# Patient Record
Sex: FEMALE | Race: WHITE | NOT HISPANIC OR LATINO | RURAL
[De-identification: names, ages, dates, MRNs, and addresses within clinical notes are randomized per-mention and may not be internally consistent; named-entity substitution may affect disease eponyms.]

---

## 2024-03-12 ENCOUNTER — LAB (OUTPATIENT)
Dept: RURAL CLINIC 2 | Facility: CLINIC | Age: 84
End: 2024-03-12

## 2024-03-12 ENCOUNTER — OV NP (OUTPATIENT)
Dept: RURAL CLINIC 2 | Facility: CLINIC | Age: 84
End: 2024-03-12
Payer: MEDICARE

## 2024-03-12 VITALS
DIASTOLIC BLOOD PRESSURE: 89 MMHG | TEMPERATURE: 97.1 F | BODY MASS INDEX: 24.84 KG/M2 | WEIGHT: 135 LBS | HEART RATE: 66 BPM | HEIGHT: 62 IN | SYSTOLIC BLOOD PRESSURE: 142 MMHG

## 2024-03-12 DIAGNOSIS — K52.89 (LYMPHOCYTIC) MICROSCOPIC COLITIS: ICD-10-CM

## 2024-03-12 DIAGNOSIS — R19.8 CHANGE IN BOWEL MOVEMENT: ICD-10-CM

## 2024-03-12 DIAGNOSIS — R10.84 ABDOMINAL CRAMPING, GENERALIZED: ICD-10-CM

## 2024-03-12 DIAGNOSIS — R93.3 ABNORMAL COMPUTED TOMOGRAPHY OF SIGMOID COLON: ICD-10-CM

## 2024-03-12 PROBLEM — 428283002: Status: ACTIVE | Noted: 2024-03-12

## 2024-03-12 PROBLEM — 247330004: Status: ACTIVE | Noted: 2024-03-12

## 2024-03-12 PROBLEM — 88111009: Status: ACTIVE | Noted: 2024-03-12

## 2024-03-12 PROBLEM — 129679001: Status: ACTIVE | Noted: 2024-03-12

## 2024-03-12 PROBLEM — 64226004: Status: ACTIVE | Noted: 2024-03-12

## 2024-03-12 PROCEDURE — 99204 OFFICE O/P NEW MOD 45 MIN: CPT | Performed by: NURSE PRACTITIONER

## 2024-03-12 RX ORDER — NAPROXEN 500 MG/1
TABLET ORAL
Qty: 0 | Refills: 0 | Status: DISCONTINUED | COMMUNITY
Start: 1900-01-01

## 2024-03-12 RX ORDER — TRIAMTERENE AND HYDROCHLOROTHIAZIDE 37.5; 25 MG/1; MG/1
CAPSULE ORAL
Qty: 0 | Refills: 0 | Status: ACTIVE | COMMUNITY
Start: 1900-01-01

## 2024-03-12 RX ORDER — ATENOLOL 50 MG/1
TABLET ORAL
Qty: 0 | Refills: 0 | Status: ACTIVE | COMMUNITY
Start: 1900-01-01

## 2024-03-12 RX ORDER — PRAVASTATIN SODIUM 40 MG/1
TABLET ORAL
Qty: 0 | Refills: 0 | Status: ACTIVE | COMMUNITY
Start: 1900-01-01

## 2024-03-12 RX ORDER — PREDNISONE 20 MG/1
1 TABLET TABLET ORAL ONCE A DAY
Status: ACTIVE | COMMUNITY

## 2024-03-12 RX ORDER — TEMAZEPAM 30 MG/1
CAPSULE ORAL
Qty: 0 | Refills: 0 | Status: ACTIVE | COMMUNITY
Start: 1900-01-01

## 2024-03-12 RX ORDER — SODIUM PICOSULFATE, MAGNESIUM OXIDE, AND ANHYDROUS CITRIC ACID 12; 3.5; 1 G/175ML; G/175ML; MG/175ML
175 ML THE FIRST DOSE THE EVENING BEFORE AND SECOND DOSE THE MORNING OF COLONOSCOPY LIQUID ORAL ONCE A DAY
Qty: 350 | Refills: 0 | OUTPATIENT
Start: 2024-03-12 | End: 2024-03-14

## 2024-03-12 RX ORDER — PANTOPRAZOLE SODIUM 40 MG/1
TAKE 1 TABLET (40 MG) BY ORAL ROUTE ONCE DAILY TABLET, DELAYED RELEASE ORAL 1
Qty: 90 | Refills: 3 | Status: DISCONTINUED | COMMUNITY
Start: 2019-05-23

## 2024-03-12 RX ORDER — ONDANSETRON 4 MG/1
ONE SL Q6 PRN NAUSEA TABLET, ORALLY DISINTEGRATING ORAL
Qty: 20 | Refills: 3 | Status: ACTIVE | COMMUNITY
Start: 2019-04-24

## 2024-03-12 NOTE — HPI-ZZZTODAY'S VISIT
The patient is an 83-year-old female who presents on referral from Loan Duffy NP for colitis.  A copy of this document to be sent to the referring provider.  The patient reports a change in bowels with intermittent loose stools and  left lower quadrant cramping  occurring intermittently for the past year.  In November she was seen at an urgent care facility in [place] for left lower quadrant pain and was diagnosed with diverticulitis confirmed by CT scan of the abdomen and pelvis.  She was treated with Augmentin and her symptoms resolved.  Patient was seen at an urgent care facility on February 27 for worsening abdominal pain and had a CT scan showing long segment wall thickening of the sigmoid colon without a discrete focal inflamed diverticulum suggestive of colitis. Stool testing was negative for C. difficile and was started on antibiotics as well as oral steroids.  She reports mild improvement in her symptoms and is currently having 6-8 semiloose bowel movements with left lower quadrant abdominal cramping.  She denies rectal bleeding.  Her last colonoscopy was completed over 10 years ago and she recalls findings of colon polyps.  Past medical history includes hypertension and hyperlipidemia.

## 2024-03-15 LAB
ADENOVIRUS F 40/41: NOT DETECTED
C. DIFFICILE TOXIN A/B, STOOL - QDX: NEGATIVE
CALPROTECTIN, STOOL - QDX: (no result)
CAMPYLOBACTER: NOT DETECTED
CLOSTRIDIUM DIFFICILE: DETECTED
ENTAMOEBA HISTOLYTICA: NOT DETECTED
ENTEROAGGREGATIVE E.COLI: NOT DETECTED
ENTEROTOXIGENIC E.COLI: NOT DETECTED
ESCHERICHIA COLI O157: NOT DETECTED
GIARDIA LAMBLIA: NOT DETECTED
NOROVIRUS GI/GII: NOT DETECTED
ROTAVIRUS A: NOT DETECTED
SALMONELLA SPP.: NOT DETECTED
SHIGA-LIKE TOXIN PRODUCING E.COLI: NOT DETECTED
SHIGELLA SPP. / ENTEROINVASIVE E.COLI: NOT DETECTED
VIBRIO PARAHAEMOLYTICUS: NOT DETECTED
VIBRIO SPP.: NOT DETECTED
YERSINIA ENTEROCOLITICA: NOT DETECTED

## 2024-03-27 ENCOUNTER — COLON (OUTPATIENT)
Dept: RURAL MEDICAL CENTER 4 | Facility: MEDICAL CENTER | Age: 84
End: 2024-03-27

## 2024-04-07 PROBLEM — 64226004: Status: ACTIVE | Noted: 2024-04-07

## 2024-04-09 ENCOUNTER — OV EP (OUTPATIENT)
Dept: RURAL CLINIC 2 | Facility: CLINIC | Age: 84
End: 2024-04-09

## 2024-04-10 ENCOUNTER — OV EP (OUTPATIENT)
Dept: RURAL CLINIC 8 | Facility: CLINIC | Age: 84
End: 2024-04-10
Payer: MEDICARE

## 2024-04-10 VITALS
HEART RATE: 58 BPM | HEIGHT: 62 IN | BODY MASS INDEX: 24.84 KG/M2 | TEMPERATURE: 96.6 F | WEIGHT: 135 LBS | SYSTOLIC BLOOD PRESSURE: 118 MMHG | DIASTOLIC BLOOD PRESSURE: 70 MMHG

## 2024-04-10 DIAGNOSIS — R10.84 GENERALIZED ABDOMINAL PAIN: ICD-10-CM

## 2024-04-10 DIAGNOSIS — R19.4 CHANGE IN BOWEL HABITS: ICD-10-CM

## 2024-04-10 DIAGNOSIS — K50.10 SEGMENTAL COLITIS WITHOUT COMPLICATION: ICD-10-CM

## 2024-04-10 DIAGNOSIS — R19.7 DIARRHEA, UNSPECIFIED TYPE: ICD-10-CM

## 2024-04-10 PROCEDURE — 99214 OFFICE O/P EST MOD 30 MIN: CPT | Performed by: INTERNAL MEDICINE

## 2024-04-10 RX ORDER — HYOSCYAMINE SULFATE 0.12 MG/1
1 TABLET UNDER THE TONGUE AND ALLOW TO DISSOLVE PRN TABLET SUBLINGUAL
Qty: 60 | Refills: 1 | Status: DISCONTINUED | COMMUNITY
Start: 2024-04-05 | End: 2024-06-04

## 2024-04-10 RX ORDER — TEMAZEPAM 30 MG/1
CAPSULE ORAL
Qty: 0 | Refills: 0 | Status: ACTIVE | COMMUNITY
Start: 1900-01-01

## 2024-04-10 RX ORDER — PREDNISONE 20 MG/1
1 TABLET TABLET ORAL ONCE A DAY
Status: DISCONTINUED | COMMUNITY

## 2024-04-10 RX ORDER — PRAVASTATIN SODIUM 40 MG/1
TABLET ORAL
Qty: 0 | Refills: 0 | Status: ACTIVE | COMMUNITY
Start: 1900-01-01

## 2024-04-10 RX ORDER — MESALAMINE 375 MG/1
4 CAPSULES IN THE MORNING CAPSULE, EXTENDED RELEASE ORAL ONCE A DAY
Qty: 120 | Refills: 5 | Status: DISCONTINUED | COMMUNITY
Start: 2024-03-30 | End: 2024-09-26

## 2024-04-10 RX ORDER — TRIAMTERENE AND HYDROCHLOROTHIAZIDE 37.5; 25 MG/1; MG/1
CAPSULE ORAL
Qty: 0 | Refills: 0 | Status: ACTIVE | COMMUNITY
Start: 1900-01-01

## 2024-04-10 RX ORDER — ONDANSETRON 4 MG/1
ONE SL Q6 PRN NAUSEA TABLET, ORALLY DISINTEGRATING ORAL
Qty: 20 | Refills: 3 | Status: DISCONTINUED | COMMUNITY
Start: 2019-04-24

## 2024-04-10 RX ORDER — ATENOLOL 50 MG/1
TABLET ORAL
Qty: 0 | Refills: 0 | Status: ACTIVE | COMMUNITY
Start: 1900-01-01

## 2024-04-10 NOTE — HPI-ZZZTODAY'S VISIT
The patient is an 83-year-old female who presents on referral from Loan Duffy NP for colitis.  A copy of this document to be sent to the referring provider.  The patient reports a change in bowels with intermittent loose stools and  left lower quadrant cramping  occurring intermittently for the past year.  In November she was seen at an urgent care facility in [place] for left lower quadrant pain and was diagnosed with diverticulitis confirmed by CT scan of the abdomen and pelvis.  She was treated with Augmentin and her symptoms resolved.  Patient was seen at an urgent care facility on February 27 for worsening abdominal pain and had a CT scan showing long segment wall thickening of the sigmoid colon without a discrete focal inflamed diverticulum suggestive of colitis. Stool testing was negative for C. difficile and was started on antibiotics as well as oral steroids.  She reports mild improvement in her symptoms and is currently having 6-8 semiloose bowel movements with left lower quadrant abdominal cramping.  She denies rectal bleeding.  Her last colonoscopy was completed over 10 years ago and she recalls findings of colon polyps.  Past medical history includes hypertension and hyperlipidemia. -  04/10/2024: The patient comes to the office in postprocedure follow-up.  I recently did her colonoscopy secondary to abdominal pain, abnormal CT scan of the abdomen, change in bowel habits, and a prior history of treated diverticulitis.  She had clearly a abnormal section of colon confined to the sigmoid colon.  This clinically appeared to be SCAD-segmental colitis associated with diverticulosis.  However biopsies from this area were more consistent with chronic ulcerative colitis.  I started her on oral Apriso.  She called me last week with continued abdominal cramping and frequent diarrhea.

## 2024-04-29 ENCOUNTER — OV EP (OUTPATIENT)
Dept: RURAL CLINIC 2 | Facility: CLINIC | Age: 84
End: 2024-04-29

## 2024-04-29 VITALS
TEMPERATURE: 97.5 F | HEART RATE: 66 BPM | DIASTOLIC BLOOD PRESSURE: 72 MMHG | HEIGHT: 62 IN | BODY MASS INDEX: 22.45 KG/M2 | SYSTOLIC BLOOD PRESSURE: 125 MMHG | WEIGHT: 122 LBS

## 2024-04-29 VITALS — TEMPERATURE: 97.7 F | HEIGHT: 62 IN

## 2024-04-29 PROBLEM — 111583006: Status: ACTIVE | Noted: 2024-04-29

## 2024-04-29 PROBLEM — 266435005: Status: ACTIVE | Noted: 2024-04-29

## 2024-04-29 PROBLEM — 423590009: Status: ACTIVE | Noted: 2024-04-29

## 2024-04-29 PROBLEM — 102614006: Status: ACTIVE | Noted: 2024-04-29

## 2024-04-29 PROBLEM — 62315008: Status: ACTIVE | Noted: 2024-04-29

## 2024-04-29 PROBLEM — 88111009: Status: ACTIVE | Noted: 2024-04-29

## 2024-04-29 RX ORDER — FAMOTIDINE 20 MG/1
1 TABLET TABLET, FILM COATED ORAL ONCE A DAY
Qty: 30 TABLET | Refills: 5 | OUTPATIENT
Start: 2024-04-29

## 2024-04-29 RX ORDER — VANCOMYCIN HYDROCHLORIDE 125 MG/1
1 CAPSULE CAPSULE ORAL
Qty: 40 CAPSULE | Refills: 0 | OUTPATIENT
Start: 2024-04-29 | End: 2024-05-09

## 2024-04-29 RX ORDER — ATENOLOL 50 MG/1
TABLET ORAL
Qty: 0 | Refills: 0 | Status: ACTIVE | COMMUNITY
Start: 1900-01-01

## 2024-04-29 RX ORDER — TRIAMTERENE AND HYDROCHLOROTHIAZIDE 37.5; 25 MG/1; MG/1
CAPSULE ORAL
Qty: 0 | Refills: 0 | Status: ACTIVE | COMMUNITY
Start: 1900-01-01

## 2024-04-29 RX ORDER — TEMAZEPAM 30 MG/1
CAPSULE ORAL
Qty: 0 | Refills: 0 | Status: ACTIVE | COMMUNITY
Start: 1900-01-01

## 2024-04-29 RX ORDER — PRAVASTATIN SODIUM 40 MG/1
TABLET ORAL
Qty: 0 | Refills: 0 | Status: ACTIVE | COMMUNITY
Start: 1900-01-01

## 2024-04-29 NOTE — HPI-ZZZTODAY'S VISIT
The patient is an 83-year-old female who presents on referral from Loan Duffy NP for colitis.  A copy of this document to be sent to the referring provider.  The patient reports a change in bowels with intermittent loose stools and  left lower quadrant cramping  occurring intermittently for the past year.  In November she was seen at an urgent care facility in [place] for left lower quadrant pain and was diagnosed with diverticulitis confirmed by CT scan of the abdomen and pelvis.  She was treated with Augmentin and her symptoms resolved.  Patient was seen at an urgent care facility on February 27 for worsening abdominal pain and had a CT scan showing long segment wall thickening of the sigmoid colon without a discrete focal inflamed diverticulum suggestive of colitis. Stool testing was negative for C. difficile and was started on antibiotics as well as oral steroids.  She reports mild improvement in her symptoms and is currently having 6-8 semiloose bowel movements with left lower quadrant abdominal cramping.  She denies rectal bleeding.  Her last colonoscopy was completed over 10 years agoand she recalls findings of colon polyps.  Past medical history includes hypertension and hyperlipidemia. -  04/10/2024: The patient comes to the office in postprocedure follow-up.  I recently did her colonoscopy secondary to abdominal pain, abnormal CT scan of the abdomen, change in bowel habits, and a prior history of treated diverticulitis.  She had clearly a abnormal section of colon confined to the sigmoid colon.  This clinically appeared to be SCAD-segmental colitis associated with diverticulosis.  However biopsies from this area were more consistent with chronic ulcerative colitis.  I started her on oral Apriso.  She called me last week with continued abdominal cramping and frequent diarrhea. 4/29/2024 She is f/u from a hospitalization for dehydration and C diff colitis.  She completed a 10 day tx of Vancomycin 125 mg every 6 hours, and initially she responded reporting a significant decrease in stool frequency only to return after completion of the antibiotics. She has been off antibiotic therapy x 8 days and is having urgent loose stools shortly after eating with mild abdominal cramping and no c/o bleeding. She is tolerating small amounts of a bland diet as well as one ensure per day. She has been off Apriso for the past 3 weeks.  She is c/o intermittent acid reflux. She has lost a total of 13 pounds in the past 3 weeks.

## 2024-05-01 ENCOUNTER — DASHBOARD ENCOUNTERS (OUTPATIENT)
Age: 84
End: 2024-05-01

## 2024-05-03 ENCOUNTER — TELEPHONE ENCOUNTER (OUTPATIENT)
Dept: RURAL CLINIC 2 | Facility: CLINIC | Age: 84
End: 2024-05-03

## 2024-05-13 ENCOUNTER — OFFICE VISIT (OUTPATIENT)
Dept: RURAL CLINIC 2 | Facility: CLINIC | Age: 84
End: 2024-05-13

## 2024-05-13 RX ORDER — TRIAMTERENE AND HYDROCHLOROTHIAZIDE 37.5; 25 MG/1; MG/1
CAPSULE ORAL
Qty: 0 | Refills: 0 | COMMUNITY
Start: 1900-01-01

## 2024-05-13 RX ORDER — TEMAZEPAM 30 MG/1
CAPSULE ORAL
Qty: 0 | Refills: 0 | COMMUNITY
Start: 1900-01-01

## 2024-05-13 RX ORDER — ATENOLOL 50 MG/1
TABLET ORAL
Qty: 0 | Refills: 0 | COMMUNITY
Start: 1900-01-01

## 2024-05-13 RX ORDER — PRAVASTATIN SODIUM 40 MG/1
TABLET ORAL
Qty: 0 | Refills: 0 | COMMUNITY
Start: 1900-01-01

## 2024-05-13 RX ORDER — FAMOTIDINE 20 MG/1
1 TABLET TABLET, FILM COATED ORAL ONCE A DAY
Qty: 30 TABLET | Refills: 5 | COMMUNITY
Start: 2024-04-29

## 2024-05-13 RX ORDER — FAMOTIDINE 20 MG/1
1 TABLET TABLET, FILM COATED ORAL ONCE A DAY
Qty: 30 TABLET | Refills: 5 | OUTPATIENT

## 2024-12-17 ENCOUNTER — OFFICE VISIT (OUTPATIENT)
Dept: RURAL CLINIC 2 | Facility: CLINIC | Age: 84
End: 2024-12-17
Payer: MEDICARE

## 2024-12-17 VITALS
TEMPERATURE: 97.5 F | HEIGHT: 62 IN | WEIGHT: 143 LBS | HEART RATE: 67 BPM | BODY MASS INDEX: 26.31 KG/M2 | SYSTOLIC BLOOD PRESSURE: 166 MMHG | DIASTOLIC BLOOD PRESSURE: 90 MMHG

## 2024-12-17 DIAGNOSIS — K51.318 ULCERATIVE RECTOSIGMOIDITIS WITH OTHER COMPLICATION: ICD-10-CM

## 2024-12-17 DIAGNOSIS — K91.89 LEAKAGE OF RECTAL STUMP: ICD-10-CM

## 2024-12-17 DIAGNOSIS — K64.4 BLEEDING EXTERNAL HEMORRHOIDS: ICD-10-CM

## 2024-12-17 DIAGNOSIS — Z93.3 COLOSTOMY PRESENT: ICD-10-CM

## 2024-12-17 PROBLEM — 41364008: Status: ACTIVE | Noted: 2024-12-17

## 2024-12-17 PROBLEM — 720578000: Status: ACTIVE | Noted: 2024-12-17

## 2024-12-17 PROBLEM — 302112009: Status: ACTIVE | Noted: 2024-12-17

## 2024-12-17 PROBLEM — 26421009: Status: ACTIVE | Noted: 2024-12-17

## 2024-12-17 PROCEDURE — 99213 OFFICE O/P EST LOW 20 MIN: CPT | Performed by: NURSE PRACTITIONER

## 2024-12-17 RX ORDER — TRIAMTERENE AND HYDROCHLOROTHIAZIDE 37.5; 25 MG/1; MG/1
CAPSULE ORAL
Qty: 0 | Refills: 0 | Status: ACTIVE | COMMUNITY
Start: 1900-01-01

## 2024-12-17 RX ORDER — ATENOLOL 50 MG/1
TABLET ORAL
Qty: 0 | Refills: 0 | Status: ACTIVE | COMMUNITY
Start: 1900-01-01

## 2024-12-17 RX ORDER — PRAVASTATIN SODIUM 40 MG/1
TABLET ORAL
Qty: 0 | Refills: 0 | Status: ACTIVE | COMMUNITY
Start: 1900-01-01

## 2024-12-17 RX ORDER — FAMOTIDINE 20 MG/1
1 TABLET TABLET, FILM COATED ORAL ONCE A DAY
Qty: 30 TABLET | Refills: 5 | Status: DISCONTINUED | COMMUNITY

## 2024-12-17 RX ORDER — TEMAZEPAM 30 MG/1
CAPSULE ORAL
Qty: 0 | Refills: 0 | Status: ACTIVE | COMMUNITY
Start: 1900-01-01

## 2024-12-17 NOTE — PHYSICAL EXAM RECTAL:
normal tone, non inflamed external hemorrhoids,  no masses palpable,  no red blood,  Tenderness on DAMASO. Pt declined anoscopic exam

## 2024-12-17 NOTE — HPI-ZZZTODAY'S VISIT
The patient is an 83-year-old female who presents on referral from Loan Duffy NP for colitis.  A copy of this document to be sent to the referring provider.  The patient reports a change in bowels with intermittent loose stools and  left lower quadrant cramping  occurring intermittently for the past year.  In November she was seen at an urgent care facility in [place] for left lower quadrant pain and was diagnosed with diverticulitis confirmed by CT scan of the abdomen and pelvis.  She was treated with Augmentin and her symptoms resolved.  Patient was seen at an urgent care facility on February 27 for worsening abdominal pain and had a CT scan showing long segment wall thickening of the sigmoid colon without a discrete focal inflamed diverticulum suggestive of colitis. Stool testing was negative for C. difficile and was started on antibiotics as well as oral steroids.  She reports mild improvement in her symptoms and is currently having 6-8 semiloose bowel movements with left lower quadrant abdominal cramping.  She denies rectal bleeding.  Her last colonoscopy was completed over 10 years agoand she recalls findings of colon polyps.  Past medical history includes hypertension and hyperlipidemia. -  04/10/2024: The patient comes to the office in postprocedure follow-up.  I recently did her colonoscopy secondary to abdominal pain, abnormal CT scan of the abdomen, change in bowel habits, and a prior history of treated diverticulitis.  She had clearly a abnormal section of colon confined to the sigmoid colon.  This clinically appeared to be SCAD-segmental colitis associated with diverticulosis.  However biopsies from this area were more consistent with chronic ulcerative colitis.  I started her on oral Apriso.  She called me last week with continued abdominal cramping and frequent diarrhea. 4/29/2024 She is f/u from a hospitalization for dehydration and C diff colitis.  She completed a 10 day tx of Vancomycinand initially she responded reporting a significant decrease in stool frequency only to return after completion of the antibiotics. She has been off antibiotic therapy x 8 days and is having urgent loose stools shortly after eating with mild abdominal cramping and no c/o bleeding. She is tolerating small amounts of a bland diet as well as one ensure per day. She has been off Apriso for the past 3 weeks.  She is c/o intermittent acid reflux. She has lost a total of 13 pounds in the past 3 weeks.  12/17/2024 The pt reportedly was hospitalized this past May at [facility] for C diff colitis and subsequently underwent colectomy with end colostomy for what sounds like a proximal sigmoid stricture.  I have none of these records and to be requested. She reports normal soft semi formed stool via colostomy. Stoma examined and is healthy and pink. She did not f/u with the surgeon post-operatively. She has concerns for intermittent gritty rectal leakage with scan heme. She reports a h/o hemorrhoids. No c/o  rectal pain, burning or itching.

## 2025-06-17 ENCOUNTER — OFFICE VISIT (OUTPATIENT)
Dept: RURAL CLINIC 8 | Facility: CLINIC | Age: 85
End: 2025-06-17
Payer: MEDICARE

## 2025-06-17 ENCOUNTER — TELEPHONE ENCOUNTER (OUTPATIENT)
Dept: RURAL CLINIC 2 | Facility: CLINIC | Age: 85
End: 2025-06-17

## 2025-06-17 DIAGNOSIS — Z93.3 COLOSTOMY IN PLACE: ICD-10-CM

## 2025-06-17 DIAGNOSIS — K51.311 CHRONIC ULCERATIVE RECTOSIGMOIDITIS WITH RECTAL BLEEDING: ICD-10-CM

## 2025-06-17 DIAGNOSIS — K52.9 IBD (INFLAMMATORY BOWEL DISEASE): ICD-10-CM

## 2025-06-17 DIAGNOSIS — Z98.890 HISTORY OF COLON SURGERY: ICD-10-CM

## 2025-06-17 DIAGNOSIS — K92.1 HEMATOCHEZIA: ICD-10-CM

## 2025-06-17 PROBLEM — 302112009: Status: ACTIVE | Noted: 2025-06-17

## 2025-06-17 PROBLEM — 52506002: Status: ACTIVE | Noted: 2025-06-17

## 2025-06-17 PROCEDURE — 99214 OFFICE O/P EST MOD 30 MIN: CPT | Performed by: INTERNAL MEDICINE

## 2025-06-17 RX ORDER — PRAVASTATIN SODIUM 40 MG/1
TABLET ORAL
Qty: 0 | Refills: 0 | Status: ACTIVE | COMMUNITY
Start: 1900-01-01

## 2025-06-17 RX ORDER — TRIAMTERENE AND HYDROCHLOROTHIAZIDE 37.5; 25 MG/1; MG/1
CAPSULE ORAL
Qty: 0 | Refills: 0 | Status: ACTIVE | COMMUNITY
Start: 1900-01-01

## 2025-06-17 RX ORDER — MESALAMINE 1000 MG/1
1 SUPPOSITORY AT BEDTIME SUPPOSITORY RECTAL AT BEDTIME
Qty: 60 | Refills: 1
Start: 2025-06-17

## 2025-06-17 RX ORDER — MESALAMINE 1000 MG/1
2 TABLETS WITH A MEAL SUPPOSITORY RECTAL ONCE A DAY
Qty: 60 | Refills: 0 | OUTPATIENT
Start: 2025-06-17

## 2025-06-17 RX ORDER — ATENOLOL 50 MG/1
TABLET ORAL
Qty: 0 | Refills: 0 | Status: ACTIVE | COMMUNITY
Start: 1900-01-01

## 2025-06-17 RX ORDER — TEMAZEPAM 30 MG/1
CAPSULE ORAL
Qty: 0 | Refills: 0 | Status: ACTIVE | COMMUNITY
Start: 1900-01-01

## 2025-06-17 NOTE — HPI-ZZZTODAY'S VISIT
The patient is an 83-year-old female who presents on referral from Loan Duffy NP for colitis.  A copy of this document to be sent to the referring provider.  The patient reports a change in bowels with intermittent loose stools and  left lower quadrant cramping  occurring intermittently for the past year.  In November she was seen at an urgent care facility in [place] for left lower quadrant pain and was diagnosed with diverticulitis confirmed by CT scan of the abdomen and pelvis.  She was treated with Augmentin and her symptoms resolved.  Patient was seen at an urgent care facility on February 27 for worsening abdominal pain and had a CT scan showing long segment wall thickening of the sigmoid colon without a discrete focal inflamed diverticulum suggestive of colitis. Stool testing was negative for C. difficile and was started on antibiotics as well as oral steroids.  She reports mild improvement in her symptoms and is currently having 6-8 semiloose bowel movements with left lower quadrant abdominal cramping.  She denies rectal bleeding.  Her last colonoscopy was completed over 10 years agoand she recalls findings of colon polyps.  Past medical history includes hypertension and hyperlipidemia. -  04/10/2024: The patient comes to the office in postprocedure follow-up.  I recently did her colonoscopy secondary to abdominal pain, abnormal CT scan of the abdomen, change in bowel habits, and a prior history of treated diverticulitis.  She had clearly a abnormal section of colon confined to the sigmoid colon.  This clinically appeared to be SCAD-segmental colitis associated with diverticulosis.  However biopsies from this area were more consistent with chronic ulcerative colitis.  I started her on oral Apriso.  She called me last week with continued abdominal cramping and frequent diarrhea. 4/29/2024 She is f/u from a hospitalization for dehydration and C diff colitis.  She completed a 10 day tx of Vancomycinand initially she responded reporting a significant decrease in stool frequency only to return after completion of the antibiotics. She has been off antibiotic therapy x 8 days and is having urgent loose stools shortly after eating with mild abdominal cramping and no c/o bleeding. She is tolerating small amounts of a bland diet as well as one ensure per day. She has been off Apriso for the past 3 weeks.  She is c/o intermittent acid reflux. She has lost a total of 13 pounds in the past 3 weeks.  12/17/2024 The pt reportedly was hospitalized this past May at 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  I have none of these records and to be requested. She reports normal soft semi formed stool via colostomy. Stoma examined and is healthy and pink. She did not f/u with the surgeon post-operatively. She has concerns for intermittent gritty rectal leakage with scan heme. She reports a h/o hemorrhoids. No c/o  rectal pain, burning or itching. - 6/17/2025: patient comes for follow up. she has h/o SARIAH and had to have a sigmoid colectomy and end colostomy due to diverticulitis and sigmoid stricture. there are no plans to have a reanastomosis. I was able to get a little bit more meaningful records that showed the patient had what seemed to be an inflammatory obstruction of the sigmoid colon attributed to colonic diverticular disease. She ultimately went on to have a partial sigmoid resectionthis area along with a diverting colostomy..  She apparently had a flexible sigmoidoscopy to examine her distalcolonalong with a colonoscopy via the ostomy but of course not 1 single page of any medical records regarding this was provided to me to review in my initial evaluation of this patient here for the express purpose of having evaluation for inflammatory bowel disease.

## 2025-06-18 ENCOUNTER — OFFICE VISIT (OUTPATIENT)
Dept: RURAL CLINIC 8 | Facility: CLINIC | Age: 85
End: 2025-06-18

## 2025-08-20 ENCOUNTER — OFFICE VISIT (OUTPATIENT)
Dept: RURAL CLINIC 8 | Facility: CLINIC | Age: 85
End: 2025-08-20